# Patient Record
Sex: MALE | Race: WHITE | Employment: FULL TIME | URBAN - METROPOLITAN AREA
[De-identification: names, ages, dates, MRNs, and addresses within clinical notes are randomized per-mention and may not be internally consistent; named-entity substitution may affect disease eponyms.]

---

## 2020-06-12 ENCOUNTER — TELEPHONE (OUTPATIENT)
Dept: ORTHOPEDIC SURGERY | Age: 55
End: 2020-06-12

## 2020-06-15 ENCOUNTER — OFFICE VISIT (OUTPATIENT)
Dept: ORTHOPEDIC SURGERY | Age: 55
End: 2020-06-15
Payer: COMMERCIAL

## 2020-06-15 VITALS — WEIGHT: 202 LBS | TEMPERATURE: 97.7 F | RESPIRATION RATE: 12 BRPM | BODY MASS INDEX: 27.36 KG/M2 | HEIGHT: 72 IN

## 2020-06-15 PROCEDURE — 99203 OFFICE O/P NEW LOW 30 MIN: CPT | Performed by: ORTHOPAEDIC SURGERY

## 2020-06-15 ASSESSMENT — ENCOUNTER SYMPTOMS
RESPIRATORY NEGATIVE: 1
EYES NEGATIVE: 1
ALLERGIC/IMMUNOLOGIC COMMENTS: SEASONAL
GASTROINTESTINAL NEGATIVE: 1

## 2020-06-15 NOTE — PROGRESS NOTES
Subjective:      Patient ID: Edwina Bravo is a 47 y.o. male. HPI  Katalina Ka today for an injury involving his right knee. He was injured 1 year ago. He was running in [place]. As he was descending the mountain he planted his foot and felt a pop 2 separate times in the medial aspect of the knee. He modified his workouts and did ibuprofen and ice and therapeutic exercise but continued to have symptoms. He was seen by another orthopedic surgeon back in September and he was told he could \"suck it up\" per his interpretation. Since that time is had persistent issues. He walked 14 [place] golf [place] about a month ago and was debilitated. He rested for 3 full weeks and then try to go on a hike and was incapable of doing so. Pain is 4 out of 10. He continues to use ibuprofen and ice at appropriate dosage and intervals. He is a  of [employer]. He would typically run, play golf, and bike. He is not capable of doing those activities currently. He is otherwise very healthy. Prior to 1 year ago he never had right knee pain. He currently denies any left-sided symptoms. Review of Systems   Constitutional: Negative. HENT: Negative. Eyes: Negative. Watery eyes from allergies   Respiratory: Negative. Cardiovascular: Negative. Gastrointestinal: Negative. Endocrine: Negative. Genitourinary: Negative. Musculoskeletal: Negative. Skin: Negative. Allergic/Immunologic: Positive for environmental allergies. Seasonal     Neurological: Negative. Hematological: Negative. Psychiatric/Behavioral: Negative. Objective:   Physical Exam  History: Patient's relevant past family, medical, and social history are reviewed as part of today's visit. ROS of pertinent positives and negatives as above; otherwise negative. General Exam:    Vitals: Temperature 97.7 °F (36.5 °C), temperature source Temporal, resp.  rate 12, height 6' (1.829 m), weight 202 lb

## 2020-06-23 ENCOUNTER — OFFICE VISIT (OUTPATIENT)
Dept: ORTHOPEDIC SURGERY | Age: 55
End: 2020-06-23
Payer: COMMERCIAL

## 2020-06-23 VITALS — TEMPERATURE: 97.5 F | HEIGHT: 72 IN | WEIGHT: 202 LBS | RESPIRATION RATE: 12 BRPM | BODY MASS INDEX: 27.36 KG/M2

## 2020-06-23 PROCEDURE — 99213 OFFICE O/P EST LOW 20 MIN: CPT | Performed by: ORTHOPAEDIC SURGERY

## 2020-06-23 NOTE — PROGRESS NOTES
Heather Bridges \"Huber\" returns today to follow-up his right knee MRI. Pain is 0 out of 10. He has been biking aggressively without difficulty. He feels nearly normal.          Patient's medications, allergies, past medical, surgical, social and family histories were reviewed and updated as appropriate. Relevant review of systems reviewed. General Exam:    Vitals: Temperature 97.5 °F (36.4 °C), temperature source Temporal, resp. rate 12, height 6' (1.829 m), weight 202 lb (91.6 kg). Constitutional: Patient is adequately groomed with no evidence of malnutrition  Mental Status: The patient is oriented to time, place and person. The patient's mood and affect are appropriate. Right knee today shows no joint line tenderness or effusion. He has full motion and I cannot reproduce discomfort. Right knee MRI is reviewed. It demonstrates:     HISTORY:  N16.709F, traumatic tear of medial meniscus of right knee.       TECHNICAL FACTORS:  Long- and short-axis fat- and water-weighted images were performed.       COMPARISON:  None.       FINDINGS:  ACL, PCL, LCL, MCL, flexor mechanism, and extensor mechanism are intact.       Thickened, inflamed MCL.  Sprain, scarring, and capsulitis are present.       Trizonal tear with both vertical and horizontal components involve the posterior horn and body    of the medial meniscus.       CONCLUSION:   1. At least 2-3cm trizonal tear with both vertical and horizontal components involves the    posterior horn and body of the medial meniscus. 2. Grade 1 MCL sprain and medial capsulitis. 3. Dehisced Baker's cyst and mild semimembranosus bursitis. 4. No fracture, AVN, or mass. 5. Please see above. These findings on the report and the images with the patient. Assessment: Right knee medial meniscus tear that is currently asymptomatic. Plan: Given his age and activity level were going to start with a round of physical therapy.   If he begins having refractory symptoms,